# Patient Record
Sex: FEMALE | Race: WHITE | Employment: UNEMPLOYED | URBAN - METROPOLITAN AREA
[De-identification: names, ages, dates, MRNs, and addresses within clinical notes are randomized per-mention and may not be internally consistent; named-entity substitution may affect disease eponyms.]

---

## 2018-12-14 ENCOUNTER — ED HISTORICAL/CONVERTED ENCOUNTER (OUTPATIENT)
Dept: OTHER | Age: 48
End: 2018-12-14

## 2020-09-30 ENCOUNTER — TRANSCRIBE ORDER (OUTPATIENT)
Dept: SCHEDULING | Age: 50
End: 2020-09-30

## 2020-09-30 DIAGNOSIS — Z12.31 SCREENING MAMMOGRAM FOR HIGH-RISK PATIENT: Primary | ICD-10-CM

## 2021-11-05 ENCOUNTER — OFFICE VISIT (OUTPATIENT)
Dept: OBGYN CLINIC | Age: 51
End: 2021-11-05
Payer: MEDICAID

## 2021-11-05 VITALS
SYSTOLIC BLOOD PRESSURE: 112 MMHG | WEIGHT: 149.5 LBS | DIASTOLIC BLOOD PRESSURE: 68 MMHG | HEIGHT: 63 IN | BODY MASS INDEX: 26.49 KG/M2

## 2021-11-05 DIAGNOSIS — N95.0 PMB (POSTMENOPAUSAL BLEEDING): Primary | ICD-10-CM

## 2021-11-05 DIAGNOSIS — N94.6 DYSMENORRHEA: ICD-10-CM

## 2021-11-05 DIAGNOSIS — N92.1 MENORRHAGIA WITH IRREGULAR CYCLE: ICD-10-CM

## 2021-11-05 PROCEDURE — 58100 BIOPSY OF UTERUS LINING: CPT | Performed by: OBSTETRICS & GYNECOLOGY

## 2021-11-05 RX ORDER — OXYCODONE HYDROCHLORIDE 5 MG/1
5 TABLET ORAL
Qty: 10 TABLET | Refills: 0 | Status: SHIPPED | OUTPATIENT
Start: 2021-11-05 | End: 2021-11-08

## 2021-11-05 NOTE — PROGRESS NOTES
Chief Complaint   Patient presents with   [address] [facility] Patient     PMB     Visit Vitals  /68 (BP 1 Location: Right arm, BP Patient Position: Sitting, BP Cuff Size: Small adult)   Ht 5' 3\" (1.6 m)   Wt 149 lb 8 oz (67.8 kg)   BMI 26.48 kg/m²

## 2021-11-05 NOTE — PROGRESS NOTES
Procedure note: Endometrial biopsy    Kevin Love is a G4 ,  45 y.o. female WHITE/NON- No LMP recorded. (Menstrual status: Menopause). The patient has a history of The primary encounter diagnosis was PMB (postmenopausal bleeding). Diagnoses of Dysmenorrhea and Menorrhagia with irregular cycle were also pertinent to this visit. and presents for an endometrial biopsy. Indications:   After the indications, risks, benefits, and alternatives to performing an endometrial biopsy were explained to the patient, her questions were answered and informed consent was obtained. Procedure: The patient was placed on the table in the dorsal lithotomy position. A bimanual exam showed the uterus to be anterior. The uterus was enlarged. A speculum was placed in the vagina. The cervix was visualized and prepped with zephrin. A tenaculum was  placed on the anterior lip of the cervix for traction. It was necessary to dilate the cervix. A pipelle was passed through the endocervical canal without difficulty. The uterus was sounded to 8 cm's. A moderate amount of tissue was returned. This tissue was placed in formalin and sent to pathology. It was felt that an adequate sample was obtained. The patient tolerated the procedure well and she reported mild cramping. The tenaculum and speculum were removed. Post Procedural Status: The patient was observed for 10 minutes after the procedure. She had mild cramping at the time of discharge. There were no complications. The patient was discharged in stable condition. Diagnoses and all orders for this visit:    1. PMB (postmenopausal bleeding)  -     SURGICAL PATHOLOGY  -     oxyCODONE IR (ROXICODONE) 5 mg immediate release tablet; Take 1 Tablet by mouth every six (6) hours as needed for Pain for up to 3 days. Max Daily Amount: 20 mg.    2. Dysmenorrhea    3. Menorrhagia with irregular cycle    Diff.  Dx DWP    Plan: Questions addressed, await PATH  Counseled re: diet, exercise, healthy lifestyle

## 2021-11-09 ENCOUNTER — TELEPHONE (OUTPATIENT)
Dept: OBGYN CLINIC | Age: 51
End: 2021-11-09

## 2021-11-09 DIAGNOSIS — R10.2 PELVIC PAIN: Primary | ICD-10-CM

## 2021-11-09 PROBLEM — C54.9 CARCINOSARCOMA OF BODY OF UTERUS (HCC): Status: ACTIVE | Noted: 2021-11-09

## 2021-11-09 LAB
CPT CODES, 490044: ABNORMAL
CPT DISCLAIMER: ABNORMAL
CYTOLOGY SPEC DOC CYTO: ABNORMAL
DIAGNOSIS SYNOPSIS:: ABNORMAL
DX ICD CODE: ABNORMAL
DX ICD CODE: ABNORMAL
PATH REPORT.COMMENTS IMP SPEC: ABNORMAL
PATH REPORT.GROSS SPEC: ABNORMAL
PATH REPORT.RELEVANT HX SPEC: ABNORMAL
PATHOLOGIST NAME: ABNORMAL
SPECIMEN SOURCE: ABNORMAL

## 2021-11-09 RX ORDER — OXYCODONE HYDROCHLORIDE 5 MG/1
5 TABLET ORAL
Qty: 12 TABLET | Refills: 0 | Status: SHIPPED | OUTPATIENT
Start: 2021-11-09 | End: 2021-11-12

## 2021-11-09 NOTE — TELEPHONE ENCOUNTER
Patient called for the results of her biopsy. Advised her it is not back yet. She is also c/o being in pain again and requesting to speak with Dr Dipak Prasad. Message forwarded to Dr Dipak Prasad.

## 2021-11-10 NOTE — PROGRESS NOTES
PATH results DWP    Dr. Luzmaria Johnson office contacted and instructed to call pt for an appointment    Will fax last note, PATH and Demographics to Dr. Luzmaria Johnson office

## 2022-02-04 ENCOUNTER — HOSPITAL ENCOUNTER (EMERGENCY)
Age: 52
Discharge: LEFT BEFORE DISCHARGE | End: 2022-02-04
Attending: EMERGENCY MEDICINE
Payer: MEDICAID

## 2022-02-04 VITALS
HEART RATE: 89 BPM | SYSTOLIC BLOOD PRESSURE: 115 MMHG | OXYGEN SATURATION: 98 % | RESPIRATION RATE: 16 BRPM | WEIGHT: 149 LBS | HEIGHT: 63 IN | DIASTOLIC BLOOD PRESSURE: 71 MMHG | TEMPERATURE: 98.2 F | BODY MASS INDEX: 26.4 KG/M2

## 2022-02-04 DIAGNOSIS — R52 BODY ACHES: Primary | ICD-10-CM

## 2022-02-04 PROCEDURE — 99282 EMERGENCY DEPT VISIT SF MDM: CPT

## 2022-02-04 NOTE — ED TRIAGE NOTES
Presents to ed with vane says that she was called this morning from her aunt saying she was in extreme pain spoke with chemo dr and was told to come to ER to pain management. States that she is under a lot of stress.   Vane says that aunt is hallucinating and paranoia

## 2022-02-04 NOTE — ED NOTES
Pt walked out of ER with her niece. Pt states she will go to [facility] instead. Charge KEIRA Burks and Dr. Carmine Myers updated and aware.

## 2022-02-04 NOTE — BSMART NOTE
Pt arrived at ED via private vehicle (family) and assessed in ED 21    Pt presented with anxiety, Denies SI and Denies HI     Pt presented with well-groomed appearance. Pt thought process circumstantial    Pt cognition  appropriate decision making and impulsive, as she got up and walked out of the ED after [address] Intake assess. Pt reports has never been hospitalized     Most Recent Hospitalizations if any: na    Pt reports none    Pt does not have a hx of legal issues. Pt does not have hx of violence/aggression     Pt reports no substance use    Pt UDS positive for: No results    Hx. Of Substance Treatment: NO  When: Not Applicable  Where: Not Applicable    Highest Level of Education: 7th grade    Employment: NO    Source of Income: Family    Housing: [facility]    Access to Weapons: NO    If weapons, Have they been removed: N/A    Decision Making:    Does Patient have a guardian/POA: NO    If so, Name of Guardian/POA: na    Contact Information: na    Was Paperwork Provided?: NO    If not, Was it Requested:NO                                                                      Who to Follow Up With for Paperwork:na    Was Information Emailed to Director and Supervisor: NO    Trauma Hx:   Sexual: NO  When:  Not Applicable By Whom:Not Applicable    Physical: NO  When: Not Applicable By Whom:Not Applicable    Verbal: NO  When: Not Applicable By Whom:Not Applicable      Family Support: YES    Who: vane Schmitz contacted and reports pt does not meet inpatient level of care and will follow up with resources outpatient as needed. Pt is to f/u with PCP. This writer notified assigned RN [facility] and assigned physician Dr. Funmilayo Montaño. Safety Plan Completed: N/A      PATIENT NARRATIVE SUMMARY:  Pt assessed face to face in ED 21. Pt is here for pain management, sent by her Chemo Dr. Ugo cifuentes stated to triage nurse that pt was under stress, paranoid and hallucinations.   Upon assessment pt and niece state that pts live in [place] was bugging her bedroom, which was found and he is no longer living there. Pt denies paranoia, hallucinations SI HI Hallucinations. States she is here for pain management. Apparently after Intake left the room, pt got up and walked out of ED for [address] assessing her. Dr. Edgardo Ugarte notified. This writer will follow up as needed.

## 2022-02-04 NOTE — ED PROVIDER NOTES
EMERGENCY DEPARTMENT HISTORY AND PHYSICAL EXAM      Date: 2/4/2022  Patient Name: Pravin Moses      History of Presenting Illness     Chief Complaint   Patient presents with   [address] Problem    Muscle Pain       History Provided By: Patient    HPI: Pravin Moses, 45 y.o. female with a past medical history significant Metastatic cancer currently undergoing treatment presents to the ED with cc of mild diffuse body aches. She states that he may have today just has body aches all over. She denies any fever, chills, nausea, vomiting, chest pain, shortness of breath, rash, diarrhea, headache, night sweats. No exacerbating or relieving factors and she has treated with tramadol without relief. Symptoms are mild to moderate nonprogressive and constant    There are no other complaints, changes, or physical findings at this time. PCP: None        Past History     Past Medical History:  Past Medical History:   Diagnosis Date    Carcinosarcoma of body of uterus ([facility] [address] 11/09/2021    EMBX performed 11-05-21    Pelvic pain 11/05/2021    PMB (postmenopausal bleeding) 11/05/2021       Past Surgical History:  Past Surgical History:   Procedure Laterality Date    DC LAP,TUBAL CAUTERY         Family History:  Family History   Problem Relation Age of Onset    Heart Attack Sister        Social History:  Social History     Tobacco Use    Smoking status: Current Every Day Smoker     Packs/day: 0.25    Smokeless tobacco: Never Used   Vaping Use    Vaping Use: Never used   Substance Use Topics    Alcohol use: Not Currently    Drug use: Never       Allergies:  No Known Allergies      Review of Systems     Review of Systems   Constitutional: Negative. Negative for appetite change, chills, fatigue and fever. HENT: Negative. Negative for congestion and sinus pain. Eyes: Negative. Negative for pain and visual disturbance. Respiratory: Negative. Negative for chest tightness and shortness of breath. Cardiovascular: Negative. Negative for chest pain. Gastrointestinal: Negative. Negative for abdominal pain, diarrhea, nausea and vomiting. Genitourinary: Negative. Negative for difficulty urinating. No discharge   Musculoskeletal: Positive for myalgias. Negative for arthralgias. Skin: Negative. Negative for rash. Neurological: Negative. Negative for weakness and headaches. Hematological: Negative. Psychiatric/Behavioral: Negative. Negative for agitation. The patient is not nervous/anxious. All other systems reviewed and are negative. Physical Exam     Physical Exam  Vitals and nursing note reviewed. Constitutional:       General: She is not in acute distress. Appearance: She is well-developed. HENT:      Head: Normocephalic and atraumatic. Nose: Nose normal.      Mouth/Throat:      Mouth: Mucous membranes are moist.      Pharynx: Oropharynx is clear. No oropharyngeal exudate. Eyes:      General:         Right eye: No discharge. Left eye: No discharge. Conjunctiva/sclera: Conjunctivae normal.      Pupils: Pupils are equal, round, and reactive to light. Cardiovascular:      Rate and Rhythm: Normal rate and regular rhythm. Chest Wall: PMI is not displaced. No thrill. Heart sounds: Normal heart sounds. No murmur heard. No friction rub. No gallop. Comments: Port in left upper chest.... Wound from infected port in right upper chest  Pulmonary:      Effort: Pulmonary effort is normal. No respiratory distress. Breath sounds: Normal breath sounds. No wheezing or rales. Chest:      Chest wall: No tenderness. Abdominal:      General: Bowel sounds are normal. There is no distension. Palpations: Abdomen is soft. There is no mass. Tenderness: There is no abdominal tenderness. There is no guarding or rebound. Musculoskeletal:         General: Normal range of motion. Cervical back: Normal range of motion and neck supple. Lymphadenopathy:      Cervical: No cervical adenopathy. Skin:     General: Skin is warm and dry. Capillary Refill: Capillary refill takes less than 2 seconds. Findings: No erythema or rash. Neurological:      Mental Status: She is alert and oriented to person, place, and time. Cranial Nerves: No cranial nerve deficit. Coordination: Coordination normal.   Psychiatric:         Mood and Affect: Mood normal.         Behavior: Behavior normal.         Lab and Diagnostic Study Results     Labs -   No results found for this or any previous visit (from the past 12 hour(s)). Radiologic Studies -   [unfilled]  CT Results  (Last 48 hours)    None        CXR Results  (Last 48 hours)    None          Medical Decision Making and ED Course   - I am the first and primary provider for this patient AND AM THE PRIMARY PROVIDER OF RECORD. - I reviewed the vital signs, available nursing notes, past medical history, past surgical history, family history and social history. - Initial assessment performed. The patients presenting problems have been discussed, and the staff are in agreement with the care plan formulated and outlined with them. I have encouraged them to ask questions as they arise throughout their visit. Vital Signs-Reviewed the patient's vital signs. No data found. Records Reviewed: Nursing Notes    The patient presents with body aches with a differential diagnosis of posttreatment myalgias versus medication side effect, dehydration, electrolyte abnormality, rhabdomyolysis. Will assess with basic labs medical clearance and CK. Will treat with IV fluid    ED Course:              Provider Notes (Medical Decision Making):   Patient is a 62-year-old female currently undergoing radiation and chemo for metastatic cancer. Patient appeared to be dehydrated patient refused further treatment at this point time decided to leave.   He is accompanied by her daughter who is in agreement with the care plan as outlined. [facility]           Consultations:       Consultations: - NONE        Procedures and Critical Care       Performed by: Marie Howard MD  PROCEDURES:  Procedures         Disposition     Disposition: Condition stable    Discharged    Remove if not discharged  DISCHARGE PLAN:  1. There are no discharge medications for this patient. 2.   Follow-up Information     Follow up With Specialties Details Why [address] EMERGENCY DEPT Emergency Medicine Call  As needed, If symptoms worsen [address] [address]  [phone]        3. Return to ED if worse   4. There are no discharge medications for this patient. Diagnosis     Clinical Impression:   1. Body aches        Attestations:    Marie Howard MD    Please note that this dictation was completed with [employer], the computer voice recognition software. Quite often unanticipated grammatical, syntax, homophones, and other interpretive errors are inadvertently transcribed by the computer software. Please disregard these errors. Please excuse any errors that have escaped final proofreading. Thank you.

## 2022-02-10 ENCOUNTER — HOSPITAL ENCOUNTER (OUTPATIENT)
Dept: RADIATION THERAPY | Age: 52
Discharge: HOME | End: 2022-02-10

## 2022-02-15 ENCOUNTER — HOSPITAL ENCOUNTER (OUTPATIENT)
Dept: RADIATION THERAPY | Age: 52
Discharge: HOME | End: 2022-02-15
Payer: MEDICAID

## 2022-02-15 PROCEDURE — 77290 THER RAD SIMULAJ FIELD CPLX: CPT

## 2022-02-17 ENCOUNTER — HOSPITAL ENCOUNTER (OUTPATIENT)
Dept: RADIATION THERAPY | Age: 52
Discharge: HOME | End: 2022-02-17
Payer: MEDICAID

## 2022-02-17 PROCEDURE — 77290 THER RAD SIMULAJ FIELD CPLX: CPT

## 2022-02-17 PROCEDURE — 77334 RADIATION TREATMENT AID(S): CPT

## 2022-03-07 ENCOUNTER — HOSPITAL ENCOUNTER (OUTPATIENT)
Dept: RADIATION THERAPY | Age: 52
Discharge: HOME | End: 2022-03-07
Payer: MEDICAID

## 2022-03-07 PROCEDURE — 77417 THER RADIOLOGY PORT IMAGE(S): CPT

## 2022-03-07 PROCEDURE — 77412 RADIATION TX DELIVERY LVL 3: CPT

## 2022-03-08 ENCOUNTER — HOSPITAL ENCOUNTER (OUTPATIENT)
Dept: RADIATION THERAPY | Age: 52
Discharge: HOME | End: 2022-03-08
Payer: MEDICAID

## 2022-03-08 PROCEDURE — 77412 RADIATION TX DELIVERY LVL 3: CPT

## 2022-03-09 ENCOUNTER — HOSPITAL ENCOUNTER (OUTPATIENT)
Dept: RADIATION THERAPY | Age: 52
Discharge: HOME | End: 2022-03-09
Payer: MEDICAID

## 2022-03-09 PROCEDURE — 77412 RADIATION TX DELIVERY LVL 3: CPT

## 2022-03-10 ENCOUNTER — HOSPITAL ENCOUNTER (OUTPATIENT)
Dept: RADIATION THERAPY | Age: 52
Discharge: HOME | End: 2022-03-10
Payer: MEDICAID

## 2022-03-10 PROCEDURE — 77412 RADIATION TX DELIVERY LVL 3: CPT

## 2022-03-11 ENCOUNTER — HOSPITAL ENCOUNTER (OUTPATIENT)
Dept: RADIATION THERAPY | Age: 52
Discharge: HOME | End: 2022-03-11
Payer: MEDICAID

## 2022-03-14 ENCOUNTER — HOSPITAL ENCOUNTER (OUTPATIENT)
Dept: RADIATION THERAPY | Age: 52
Discharge: HOME | End: 2022-03-14
Payer: MEDICAID

## 2022-03-14 PROCEDURE — 77412 RADIATION TX DELIVERY LVL 3: CPT

## 2022-03-14 PROCEDURE — 77336 RADIATION PHYSICS CONSULT: CPT

## 2022-03-14 PROCEDURE — 77417 THER RADIOLOGY PORT IMAGE(S): CPT

## 2022-03-15 ENCOUNTER — HOSPITAL ENCOUNTER (OUTPATIENT)
Dept: RADIATION THERAPY | Age: 52
Discharge: HOME | End: 2022-03-15
Payer: MEDICAID

## 2022-03-15 PROCEDURE — 77412 RADIATION TX DELIVERY LVL 3: CPT

## 2022-03-16 ENCOUNTER — HOSPITAL ENCOUNTER (OUTPATIENT)
Dept: RADIATION THERAPY | Age: 52
Discharge: HOME | End: 2022-03-16
Payer: MEDICAID

## 2022-03-16 PROCEDURE — 77412 RADIATION TX DELIVERY LVL 3: CPT

## 2022-03-17 ENCOUNTER — HOSPITAL ENCOUNTER (OUTPATIENT)
Dept: RADIATION THERAPY | Age: 52
Discharge: HOME | End: 2022-03-17
Payer: MEDICAID

## 2022-03-17 PROCEDURE — 77412 RADIATION TX DELIVERY LVL 3: CPT

## 2022-03-18 ENCOUNTER — HOSPITAL ENCOUNTER (OUTPATIENT)
Dept: RADIATION THERAPY | Age: 52
Discharge: HOME | End: 2022-03-18
Payer: MEDICAID

## 2022-03-18 PROCEDURE — 77412 RADIATION TX DELIVERY LVL 3: CPT

## 2022-03-21 ENCOUNTER — HOSPITAL ENCOUNTER (OUTPATIENT)
Dept: RADIATION THERAPY | Age: 52
Discharge: HOME | End: 2022-03-21
Payer: MEDICAID

## 2022-03-21 PROCEDURE — 77412 RADIATION TX DELIVERY LVL 3: CPT

## 2022-03-21 PROCEDURE — 77336 RADIATION PHYSICS CONSULT: CPT

## 2022-03-21 PROCEDURE — 77417 THER RADIOLOGY PORT IMAGE(S): CPT

## 2022-03-23 ENCOUNTER — HOSPITAL ENCOUNTER (OUTPATIENT)
Dept: RADIATION THERAPY | Age: 52
Discharge: HOME | End: 2022-03-23
Payer: MEDICAID

## 2022-03-23 PROCEDURE — 77412 RADIATION TX DELIVERY LVL 3: CPT

## 2022-03-24 ENCOUNTER — HOSPITAL ENCOUNTER (OUTPATIENT)
Dept: RADIATION THERAPY | Age: 52
Discharge: HOME | End: 2022-03-24
Payer: MEDICAID

## 2022-03-24 PROCEDURE — 77412 RADIATION TX DELIVERY LVL 3: CPT

## 2022-03-25 ENCOUNTER — HOSPITAL ENCOUNTER (OUTPATIENT)
Dept: RADIATION THERAPY | Age: 52
Discharge: HOME | End: 2022-03-25
Payer: MEDICAID

## 2022-03-25 PROCEDURE — 77412 RADIATION TX DELIVERY LVL 3: CPT

## 2022-03-28 ENCOUNTER — HOSPITAL ENCOUNTER (OUTPATIENT)
Dept: RADIATION THERAPY | Age: 52
Discharge: HOME | End: 2022-03-28
Payer: MEDICAID

## 2022-03-28 PROCEDURE — 77417 THER RADIOLOGY PORT IMAGE(S): CPT

## 2022-03-28 PROCEDURE — 77412 RADIATION TX DELIVERY LVL 3: CPT

## 2022-03-29 ENCOUNTER — HOSPITAL ENCOUNTER (OUTPATIENT)
Dept: RADIATION THERAPY | Age: 52
Discharge: HOME | End: 2022-03-29
Payer: MEDICAID

## 2022-03-29 PROCEDURE — 77412 RADIATION TX DELIVERY LVL 3: CPT

## 2022-03-29 PROCEDURE — 77336 RADIATION PHYSICS CONSULT: CPT

## 2022-03-30 ENCOUNTER — HOSPITAL ENCOUNTER (OUTPATIENT)
Dept: RADIATION THERAPY | Age: 52
Discharge: HOME | End: 2022-03-30
Payer: MEDICAID

## 2022-03-30 PROCEDURE — 77412 RADIATION TX DELIVERY LVL 3: CPT

## 2022-03-31 ENCOUNTER — HOSPITAL ENCOUNTER (OUTPATIENT)
Dept: RADIATION THERAPY | Age: 52
Discharge: HOME | End: 2022-03-31
Payer: MEDICAID

## 2022-03-31 PROCEDURE — 77412 RADIATION TX DELIVERY LVL 3: CPT

## 2022-04-01 ENCOUNTER — HOSPITAL ENCOUNTER (OUTPATIENT)
Dept: RADIATION THERAPY | Age: 52
Discharge: HOME | End: 2022-04-01
Payer: MEDICAID

## 2022-04-01 PROCEDURE — 77412 RADIATION TX DELIVERY LVL 3: CPT

## 2022-04-11 ENCOUNTER — HOSPITAL ENCOUNTER (OUTPATIENT)
Dept: RADIATION THERAPY | Age: 52
Discharge: HOME | End: 2022-04-11
Payer: MEDICAID

## 2022-04-11 PROCEDURE — 77417 THER RADIOLOGY PORT IMAGE(S): CPT

## 2022-04-11 PROCEDURE — 77412 RADIATION TX DELIVERY LVL 3: CPT

## 2022-04-13 ENCOUNTER — HOSPITAL ENCOUNTER (OUTPATIENT)
Dept: RADIATION THERAPY | Age: 52
Discharge: HOME | End: 2022-04-13
Payer: MEDICAID

## 2022-04-13 PROCEDURE — 77412 RADIATION TX DELIVERY LVL 3: CPT

## 2022-04-13 PROCEDURE — 77336 RADIATION PHYSICS CONSULT: CPT

## 2022-04-14 ENCOUNTER — HOSPITAL ENCOUNTER (OUTPATIENT)
Dept: RADIATION THERAPY | Age: 52
Discharge: HOME | End: 2022-04-14
Payer: MEDICAID

## 2022-04-14 PROCEDURE — 77412 RADIATION TX DELIVERY LVL 3: CPT

## 2022-04-15 ENCOUNTER — APPOINTMENT (OUTPATIENT)
Dept: RADIATION THERAPY | Age: 52
End: 2022-04-15
Payer: MEDICAID

## 2022-04-20 ENCOUNTER — HOSPITAL ENCOUNTER (OUTPATIENT)
Dept: RADIATION THERAPY | Age: 52
Discharge: HOME | End: 2022-04-20
Payer: MEDICAID

## 2022-04-20 PROCEDURE — 77417 THER RADIOLOGY PORT IMAGE(S): CPT

## 2022-04-20 PROCEDURE — 77412 RADIATION TX DELIVERY LVL 3: CPT

## 2022-04-21 ENCOUNTER — HOSPITAL ENCOUNTER (OUTPATIENT)
Dept: RADIATION THERAPY | Age: 52
Discharge: HOME | End: 2022-04-21
Payer: MEDICAID

## 2022-04-21 PROCEDURE — 77412 RADIATION TX DELIVERY LVL 3: CPT

## 2022-04-22 ENCOUNTER — HOSPITAL ENCOUNTER (OUTPATIENT)
Dept: RADIATION THERAPY | Age: 52
Discharge: HOME | End: 2022-04-22
Payer: MEDICAID

## 2022-04-22 PROCEDURE — 77412 RADIATION TX DELIVERY LVL 3: CPT

## 2022-04-25 ENCOUNTER — HOSPITAL ENCOUNTER (OUTPATIENT)
Dept: RADIATION THERAPY | Age: 52
Discharge: HOME | End: 2022-04-25
Payer: MEDICAID

## 2022-04-25 PROCEDURE — 77412 RADIATION TX DELIVERY LVL 3: CPT

## 2022-04-25 PROCEDURE — 77336 RADIATION PHYSICS CONSULT: CPT

## 2022-04-26 ENCOUNTER — HOSPITAL ENCOUNTER (OUTPATIENT)
Dept: RADIATION THERAPY | Age: 52
Discharge: HOME | End: 2022-04-26
Payer: MEDICAID

## 2022-04-26 PROCEDURE — 77770 HDR RDNCL NTRSTL/ICAV BRCHTX: CPT

## 2022-04-26 PROCEDURE — 77316 BRACHYTX ISODOSE PLAN SIMPLE: CPT

## 2022-04-26 PROCEDURE — 77370 RADIATION PHYSICS CONSULT: CPT

## 2022-04-26 PROCEDURE — 77290 THER RAD SIMULAJ FIELD CPLX: CPT

## 2022-04-26 PROCEDURE — 77334 RADIATION TREATMENT AID(S): CPT

## 2022-04-27 ENCOUNTER — APPOINTMENT (OUTPATIENT)
Dept: RADIATION THERAPY | Age: 52
End: 2022-04-27
Payer: MEDICAID

## 2022-04-28 ENCOUNTER — HOSPITAL ENCOUNTER (OUTPATIENT)
Dept: RADIATION THERAPY | Age: 52
Discharge: HOME | End: 2022-04-28
Payer: MEDICAID

## 2022-04-28 PROCEDURE — 77770 HDR RDNCL NTRSTL/ICAV BRCHTX: CPT

## 2022-04-28 PROCEDURE — 77290 THER RAD SIMULAJ FIELD CPLX: CPT

## 2022-04-28 PROCEDURE — 77370 RADIATION PHYSICS CONSULT: CPT

## 2022-04-29 ENCOUNTER — APPOINTMENT (OUTPATIENT)
Dept: RADIATION THERAPY | Age: 52
End: 2022-04-29
Payer: MEDICAID

## 2022-05-02 ENCOUNTER — APPOINTMENT (OUTPATIENT)
Dept: RADIATION THERAPY | Age: 52
End: 2022-05-02
Payer: MEDICAID

## 2022-05-03 ENCOUNTER — HOSPITAL ENCOUNTER (OUTPATIENT)
Dept: RADIATION THERAPY | Age: 52
Discharge: HOME | End: 2022-05-03
Payer: MEDICAID

## 2022-05-03 PROCEDURE — 77770 HDR RDNCL NTRSTL/ICAV BRCHTX: CPT

## 2022-05-03 PROCEDURE — 77290 THER RAD SIMULAJ FIELD CPLX: CPT

## 2022-05-03 PROCEDURE — 77370 RADIATION PHYSICS CONSULT: CPT

## 2022-05-16 ENCOUNTER — HOSPITAL ENCOUNTER (OUTPATIENT)
Dept: WOUND CARE | Age: 52
Discharge: HOME | End: 2022-05-16
Payer: MEDICAID

## 2022-05-16 VITALS
WEIGHT: 145 LBS | RESPIRATION RATE: 18 BRPM | HEART RATE: 90 BPM | TEMPERATURE: 97.3 F | BODY MASS INDEX: 25.69 KG/M2 | HEIGHT: 63 IN | SYSTOLIC BLOOD PRESSURE: 120 MMHG | DIASTOLIC BLOOD PRESSURE: 70 MMHG

## 2022-05-16 PROBLEM — S21.101A: Status: ACTIVE | Noted: 2022-05-16

## 2022-05-16 PROBLEM — T81.31XA SURGICAL WOUND BREAKDOWN: Status: ACTIVE | Noted: 2022-05-16

## 2022-05-16 PROCEDURE — 74011000250 HC RX REV CODE- 250: Performed by: SPECIALIST

## 2022-05-16 PROCEDURE — 99213 OFFICE O/P EST LOW 20 MIN: CPT

## 2022-05-16 RX ORDER — SULFAMETHOXAZOLE AND TRIMETHOPRIM 800; 160 MG/1; MG/1
1 TABLET ORAL 2 TIMES DAILY
COMMUNITY

## 2022-05-16 RX ORDER — LIDOCAINE HYDROCHLORIDE 20 MG/ML
15 SOLUTION OROPHARYNGEAL AS NEEDED
Status: DISCONTINUED | OUTPATIENT
Start: 2022-05-16 | End: 2022-05-18 | Stop reason: HOSPADM

## 2022-05-16 NOTE — WOUND CARE
Discharge Instructions for  [address] [place] [address], [address] [place] [address]  Telephone: [phone]    NAME:  Ashok Zambrano OF BIRTH:  1970  MEDICAL RECORD NUMBER:  [id number]  DATE:  5/16/2022      Return Appointment:  [] Dressing supply provider:   [] Home Healthcare:  [x] Return Appointment: 1 Week(s)  [] Nurse Visit:  Week(s)    [] Discharge from [facility]  [] Ordered tests:   [] Referrals:   [] Rx:   [] Other:      Wound Cleansing:   Do not scrub or use excessive force. Cleanse wound prior to applying a clean dressing with:  [] Normal Saline   [x] Mild Soap & Water/Baby Shampoo   [] Keep Wound Dry in Shower  [] Other:      Topical Treatments:  Do not apply lotions, creams, or ointments to wound bed unless directed. [] Apply moisturizing lotion to skin surrounding the wound prior to dressing change.  [] Apply antifungal ointment to skin surrounding the wound prior to dressing change.  [] Apply thin film of moisture barrier/zinc ointment to skin immediately around wound. [] Other:       Dressings:           Wound Location R chest   [x] Apply Primary Dressing:       [x] MediHoney Gel    [] MediHoney Alginate               [] Calcium Alginate      [] Calcium Alginate with Silver   [] Collagen                   [] Collagen with Silver                [] Santyl with Moistened saline gauze              [] Hydrofera Blue (cut to size and moistened)  [] Hydrofera Blue Ready (Cut to size)   [] NS wet to dry    [] Betadine wet to dry              [] Hydrogel                [] Mepitel     [] Bactroban/Mupirocin               [] Other:     [x] Cover and Secure with:     [x] Gauze [] Christopher Mjs [] Kerlix   [] Foam [] Super Absorbant [] ABD     [] ACE Wrap            [x] Other: Border   Limit contact of tape with skin.     [x] Change dressing: [x] Daily    [] Every Other Day [] Once per week   [] Twice per week   [] Three times per week [] Do Not Change Dressing   [] Other:    Dietary:  [] Diet as tolerated: [] Calorie Diabetic Diet: [] No Added Salt:  [x] Increase Protein: [] Other:     Activity:  [x] Activity as tolerated:    [] Patient has no activity restrictions       [] Strict Bedrest:   [] Remain off Work:       [] May return to full duty work:                                     [] Return to work with restrictions:               [address] Information: Should you experience any significant changes in your wound(s) or have questions about your wound care, please contact Jordana Gibbs [address] at [place] [address] 8:00 am - 4:30. If you need help with your wound outside of these hours and cannot wait until we are again available, contact your PCP or go to the hospital emergency room. PLEASE NOTE: IF YOU ARE UNABLE TO OBTAIN WOUND SUPPLIES, CONTINUE TO USE THE SUPPLIES YOU HAVE AVAILABLE UNTIL YOU ARE ABLE TO REACH US. IT IS MOST IMPORTANT TO KEEP THE WOUND COVERED AT ALL TIMES.     [x] Dr. Jesus Mckeon   [] Dr. Jesus Diaz  [] Dr. Jemma Smyth

## 2022-05-16 NOTE — WOUND CARE
[address]   Progress Note and Procedure Note   NO Procedure      [address] RECORD NUMBER:  [id number]  AGE: 45 y.o. RACE WHITE/NON-  GENDER: female  : 1970  EPISODE DATE:  2022    Subjective:   58-year-old female had port inserted for chemotherapy administration. She has had complications with wound infection involving a left subclavian port site, and Mediport was removed. That site has healed. However, she had a port on the right side which also became infected apparently and that was also removed. Is been some delayed wound healing on the right anterior chest wall, which has been treated with Medihoney gel, the patient came to the wound healing center for second opinion. Chief Complaint   Patient presents with    Wound Check     R chest         HISTORY of PRESENT ILLNESS HPI    Ronaldo Richardson is a 45 y.o. female who presents today for wound/ulcer evaluation.    History of Wound Context: R chest  Wound/Ulcer Pain Timing/Severity: none  Quality of pain: dull  Severity:  0 / 10   Modifying Factors: None  Associated Signs/Symptoms: none    Ulcer Identification:  Ulcer Type: non-healing surgical    Contributing Factors: none    Wound: R chest         PAST MEDICAL HISTORY    Past Medical History:   Diagnosis Date    Carcinosarcoma of body of uterus ([address] 2021    EMBX performed 21    Pelvic pain 2021    PMB (postmenopausal bleeding) 2021        PAST SURGICAL HISTORY    Past Surgical History:   Procedure Laterality Date    HX GYN      HX HYSTERECTOMY      MI LAP,TUBAL CAUTERY         FAMILY HISTORY    Family History   Problem Relation Age of Onset    Heart Attack Sister        SOCIAL HISTORY    Social History     Tobacco Use    Smoking status: Current Every Day Smoker     Packs/day: 0.25    Smokeless tobacco: Never Used   Vaping Use    Vaping Use: Never used   Substance Use Topics    Alcohol use: Not Currently    Drug use: Never       ALLERGIES    No Known Allergies    MEDICATIONS    Current Outpatient Medications on File Prior to Encounter   Medication Sig Dispense Refill    trimethoprim-sulfamethoxazole (Bactrim DS) 160-800 mg per tablet Take 1 Tablet by mouth two (2) times a day. No current facility-administered medications on file prior to encounter. REVIEW OF SYSTEMS    Pertinent items are noted in HPI. Objective:     Visit Vitals  /70 (BP 1 Location: Right upper arm, BP Patient Position: At rest;Sitting)   Pulse 90   Temp 97.3 °F (36.3 °C)   Resp 18   Ht 5' 3\" (1.6 m)   Wt 65.8 kg (145 lb)   BMI 25.69 kg/m²       Wt Readings from Last 3 Encounters:   05/16/22 65.8 kg (145 lb)   02/04/22 67.6 kg (149 lb)   11/05/21 67.8 kg (149 lb 8 oz)       PHYSICAL EXAM  Patient has bilateral port, the left port site is healed. The right subclavian port site has small areas of open skin breakdown, no evidence of active infection    Pertinent items are noted in HPI. Assessment:   Right anterior chest wall surgical site wound almost healed    Problem List Items Addressed This Visit     None          Procedure Note  Indications:  Based on my examination of this patient's wound(s)/ulcer(s) today, debridement is not required to promote healing and evaluate the wound base. Wound Chest Right #1 05/16/22 (Active)   Wound Image   05/16/22 1030   Wound Etiology Other (Comment) 05/16/22 1030   Dressing Status Clean;Dry; Intact 05/16/22 1030   Cleansed Cleansed with saline 05/16/22 1030   Wound Length (cm) 1.5 cm 05/16/22 1030   Wound Width (cm) 1.4 cm 05/16/22 1030   Wound Depth (cm) 0.1 cm 05/16/22 1030   Wound Surface Area (cm^2) 2.1 cm^2 05/16/22 1030   Wound Volume (cm^3) 0.21 cm^3 05/16/22 1030   Wound Assessment Granulation tissue 05/16/22 1030   Drainage Amount Moderate 05/16/22 1030   Drainage Description Sanguineous 05/16/22 1030   Wound Odor None 05/16/22 1030   Barbie-Wound/Incision Assessment Intact 05/16/22 1030 Edges Attached edges 05/16/22 1030   Wound Thickness Description Partial thickness 05/16/22 1030   Number of days: 0        Plan:   Continue Medihoney gel, dry sterile gauze  May shower daily and wash area with gentle soap    Treatment Note please see attached Discharge Instructions    Written patient dismissal instructions given to patient or POA.          Electronically signed by Mary Bourgeois MD on 5/16/2022 at 10:46 AM

## 2022-05-16 NOTE — DISCHARGE INSTRUCTIONS
Discharge Instructions for  [address] [place] [address], [address] [place] [address]  Telephone: [phone]    NAME:  Onofre Cadet OF BIRTH:  1970  MEDICAL RECORD NUMBER:  [id number]  DATE:  5/16/2022      Return Appointment:  [] Dressing supply provider:   [] Home Healthcare:  [x] Return Appointment: 1 Week(s)  [] Nurse Visit:  Week(s)    [] Discharge from [facility]  [] Ordered tests:   [] Referrals:   [] Rx:   [] Other:      Wound Cleansing:   Do not scrub or use excessive force. Cleanse wound prior to applying a clean dressing with:  [] Normal Saline   [x] Mild Soap & Water/Baby Shampoo   [] Keep Wound Dry in Shower  [] Other:      Topical Treatments:  Do not apply lotions, creams, or ointments to wound bed unless directed. [] Apply moisturizing lotion to skin surrounding the wound prior to dressing change.  [] Apply antifungal ointment to skin surrounding the wound prior to dressing change.  [] Apply thin film of moisture barrier/zinc ointment to skin immediately around wound. [] Other:       Dressings:           Wound Location R chest   [x] Apply Primary Dressing:       [x] MediHoney Gel    [] MediHoney Alginate               [] Calcium Alginate      [] Calcium Alginate with Silver   [] Collagen                   [] Collagen with Silver                [] Santyl with Moistened saline gauze              [] Hydrofera Blue (cut to size and moistened)  [] Hydrofera Blue Ready (Cut to size)   [] NS wet to dry    [] Betadine wet to dry              [] Hydrogel                [] Mepitel     [] Bactroban/Mupirocin               [] Other:     [x] Cover and Secure with:     [x] Gauze [] Espinoza Estimable [] Kerlix   [] Foam [] Super Absorbant [] ABD     [] ACE Wrap            [x] Other: Border   Limit contact of tape with skin.     [x] Change dressing: [x] Daily    [] Every Other Day [] Once per week   [] Twice per week   [] Three times per week [] Do Not Change Dressing   [] Other:    Dietary:  [] Diet as tolerated: [] Calorie Diabetic Diet: [] No Added Salt:  [x] Increase Protein: [] Other:     Activity:  [x] Activity as tolerated:    [] Patient has no activity restrictions       [] Strict Bedrest:   [] Remain off Work:       [] May return to full duty work:                                     [] Return to work with restrictions:               [address] Information: Should you experience any significant changes in your wound(s) or have questions about your wound care, please contact Jordana Gibbs [address] [place] [address] 8:00 am - 4:30. If you need help with your wound outside of these hours and cannot wait until we are again available, contact your PCP or go to the hospital emergency room. PLEASE NOTE: IF YOU ARE UNABLE TO OBTAIN WOUND SUPPLIES, CONTINUE TO USE THE SUPPLIES YOU HAVE AVAILABLE UNTIL YOU ARE ABLE TO REACH US. IT IS MOST IMPORTANT TO KEEP THE WOUND COVERED AT ALL TIMES.     [x] Dr. Gail Fontana   [] Dr. Keke Guadalupe  [] Dr. Desi Gonzalez

## 2022-06-03 ENCOUNTER — TRANSCRIBE ORDER (OUTPATIENT)
Dept: SCHEDULING | Age: 52
End: 2022-06-03

## 2022-06-03 DIAGNOSIS — C54.2 CANCER OF MYOMETRIUM (HCC): Primary | ICD-10-CM

## 2022-10-21 ENCOUNTER — HOSPITAL ENCOUNTER (EMERGENCY)
Age: 52
Discharge: ANOTHER HOSPITAL | End: 2022-10-22
Payer: MEDICAID

## 2022-10-21 ENCOUNTER — APPOINTMENT (OUTPATIENT)
Dept: CT IMAGING | Age: 52
End: 2022-10-21
Attending: NURSE PRACTITIONER
Payer: MEDICAID

## 2022-10-21 DIAGNOSIS — K65.1 INTRAPERITONEAL ABSCESS (HCC): ICD-10-CM

## 2022-10-21 DIAGNOSIS — N99.89 POSTOPERATIVE SURGICAL COMPLICATION INVOLVING GENITOURINARY SYSTEM ASSOCIATED WITH GENITOURINARY PROCEDURE, UNSPECIFIED COMPLICATION: Primary | ICD-10-CM

## 2022-10-21 DIAGNOSIS — D64.9 ANEMIA, UNSPECIFIED TYPE: ICD-10-CM

## 2022-10-21 LAB
ALBUMIN SERPL-MCNC: 2.1 G/DL (ref 3.5–5)
ALBUMIN/GLOB SERPL: 0.4 {RATIO} (ref 1.1–2.2)
ALP SERPL-CCNC: 83 U/L (ref 45–117)
ALT SERPL-CCNC: 11 U/L (ref 12–78)
ANION GAP SERPL CALC-SCNC: 7 MMOL/L (ref 5–15)
AST SERPL W P-5'-P-CCNC: 20 U/L (ref 15–37)
BASOPHILS # BLD: 0 K/UL (ref 0–0.1)
BASOPHILS NFR BLD: 0 % (ref 0–1)
BILIRUB SERPL-MCNC: 0.7 MG/DL (ref 0.2–1)
BLASTS NFR BLD MANUAL: 2 %
BUN SERPL-MCNC: 32 MG/DL (ref 6–20)
BUN/CREAT SERPL: 30 (ref 12–20)
CA-I BLD-MCNC: 8.5 MG/DL (ref 8.5–10.1)
CHLORIDE SERPL-SCNC: 108 MMOL/L (ref 97–108)
CO2 SERPL-SCNC: 19 MMOL/L (ref 21–32)
CREAT SERPL-MCNC: 1.06 MG/DL (ref 0.55–1.02)
DIFFERENTIAL METHOD BLD: ABNORMAL
EOSINOPHIL # BLD: 0 K/UL (ref 0–0.4)
EOSINOPHIL NFR BLD: 0 % (ref 0–7)
ERYTHROCYTE [DISTWIDTH] IN BLOOD BY AUTOMATED COUNT: 17.1 % (ref 11.5–14.5)
GLOBULIN SER CALC-MCNC: 4.8 G/DL (ref 2–4)
GLUCOSE SERPL-MCNC: 116 MG/DL (ref 65–100)
HCT VFR BLD AUTO: 17.7 % (ref 35–47)
HGB BLD-MCNC: 5.8 G/DL (ref 11.5–16)
IMM GRANULOCYTES # BLD AUTO: 0 K/UL
IMM GRANULOCYTES NFR BLD AUTO: 0 %
LACTATE SERPL-SCNC: 1 MMOL/L (ref 0.4–2)
LIPASE SERPL-CCNC: 48 U/L (ref 73–393)
LYMPHOCYTES # BLD: 0.8 K/UL (ref 0.8–3.5)
LYMPHOCYTES NFR BLD: 6 % (ref 12–49)
MCH RBC QN AUTO: 30.5 PG (ref 26–34)
MCHC RBC AUTO-ENTMCNC: 32.8 G/DL (ref 30–36.5)
MCV RBC AUTO: 93.2 FL (ref 80–99)
MONOCYTES # BLD: 0.8 K/UL (ref 0–1)
MONOCYTES NFR BLD: 6 % (ref 5–13)
MYELOCYTES NFR BLD MANUAL: 3 %
NEUTS BAND NFR BLD MANUAL: 1 % (ref 0–6)
NEUTS SEG # BLD: 11 K/UL (ref 1.8–8)
NEUTS SEG NFR BLD: 82 % (ref 32–75)
NRBC # BLD: 0.05 K/UL (ref 0–0.01)
NRBC BLD-RTO: 0.4 PER 100 WBC
PLATELET # BLD AUTO: 190 K/UL (ref 150–400)
PMV BLD AUTO: 11.6 FL (ref 8.9–12.9)
POTASSIUM SERPL-SCNC: 4.5 MMOL/L (ref 3.5–5.1)
PROT SERPL-MCNC: 6.9 G/DL (ref 6.4–8.2)
RBC # BLD AUTO: 1.9 M/UL (ref 3.8–5.2)
RBC MORPH BLD: ABNORMAL
SODIUM SERPL-SCNC: 134 MMOL/L (ref 136–145)
WBC # BLD AUTO: 13.2 K/UL (ref 3.6–11)

## 2022-10-21 PROCEDURE — 96374 THER/PROPH/DIAG INJ IV PUSH: CPT

## 2022-10-21 PROCEDURE — 86900 BLOOD TYPING SEROLOGIC ABO: CPT

## 2022-10-21 PROCEDURE — 96375 TX/PRO/DX INJ NEW DRUG ADDON: CPT

## 2022-10-21 PROCEDURE — P9016 RBC LEUKOCYTES REDUCED: HCPCS

## 2022-10-21 PROCEDURE — 85025 COMPLETE CBC W/AUTO DIFF WBC: CPT

## 2022-10-21 PROCEDURE — 80053 COMPREHEN METABOLIC PANEL: CPT

## 2022-10-21 PROCEDURE — 36430 TRANSFUSION BLD/BLD COMPNT: CPT

## 2022-10-21 PROCEDURE — 36415 COLL VENOUS BLD VENIPUNCTURE: CPT

## 2022-10-21 PROCEDURE — 83605 ASSAY OF LACTIC ACID: CPT

## 2022-10-21 PROCEDURE — 74011250636 HC RX REV CODE- 250/636: Performed by: NURSE PRACTITIONER

## 2022-10-21 PROCEDURE — 87040 BLOOD CULTURE FOR BACTERIA: CPT

## 2022-10-21 PROCEDURE — 83690 ASSAY OF LIPASE: CPT

## 2022-10-21 PROCEDURE — 86923 COMPATIBILITY TEST ELECTRIC: CPT

## 2022-10-21 PROCEDURE — 74177 CT ABD & PELVIS W/CONTRAST: CPT

## 2022-10-21 PROCEDURE — 74011000636 HC RX REV CODE- 636: Performed by: NURSE PRACTITIONER

## 2022-10-21 PROCEDURE — 99285 EMERGENCY DEPT VISIT HI MDM: CPT

## 2022-10-21 RX ORDER — SODIUM CHLORIDE 9 MG/ML
250 INJECTION, SOLUTION INTRAVENOUS AS NEEDED
Status: DISCONTINUED | OUTPATIENT
Start: 2022-10-21 | End: 2022-10-22 | Stop reason: HOSPADM

## 2022-10-21 RX ORDER — LEVOFLOXACIN 5 MG/ML
750 INJECTION, SOLUTION INTRAVENOUS ONCE
Status: COMPLETED | OUTPATIENT
Start: 2022-10-21 | End: 2022-10-22

## 2022-10-21 RX ORDER — ONDANSETRON 2 MG/ML
4 INJECTION INTRAMUSCULAR; INTRAVENOUS
Status: COMPLETED | OUTPATIENT
Start: 2022-10-21 | End: 2022-10-21

## 2022-10-21 RX ORDER — METRONIDAZOLE 500 MG/100ML
500 INJECTION, SOLUTION INTRAVENOUS
Status: COMPLETED | OUTPATIENT
Start: 2022-10-21 | End: 2022-10-22

## 2022-10-21 RX ORDER — MORPHINE SULFATE 4 MG/ML
4 INJECTION INTRAVENOUS ONCE
Status: COMPLETED | OUTPATIENT
Start: 2022-10-21 | End: 2022-10-21

## 2022-10-21 RX ADMIN — METRONIDAZOLE 500 MG: 500 INJECTION, SOLUTION INTRAVENOUS at 23:05

## 2022-10-21 RX ADMIN — IOPAMIDOL 100 ML: 755 INJECTION, SOLUTION INTRAVENOUS at 21:48

## 2022-10-21 RX ADMIN — MORPHINE SULFATE 4 MG: 4 INJECTION, SOLUTION INTRAMUSCULAR; INTRAVENOUS at 21:30

## 2022-10-21 RX ADMIN — SODIUM CHLORIDE 1000 ML: 9 INJECTION, SOLUTION INTRAVENOUS at 21:29

## 2022-10-21 RX ADMIN — ONDANSETRON 4 MG: 2 INJECTION INTRAMUSCULAR; INTRAVENOUS at 21:30

## 2022-10-21 NOTE — ED TRIAGE NOTES
Had hyst and colon removal due to cancer. Tonight developed abd pain while playing with grandchildren.   No n/v/d

## 2022-10-22 VITALS
WEIGHT: 121 LBS | BODY MASS INDEX: 22.26 KG/M2 | HEIGHT: 62 IN | OXYGEN SATURATION: 99 % | DIASTOLIC BLOOD PRESSURE: 92 MMHG | RESPIRATION RATE: 20 BRPM | SYSTOLIC BLOOD PRESSURE: 124 MMHG | HEART RATE: 104 BPM | TEMPERATURE: 97.9 F

## 2022-10-22 LAB
ABO + RH BLD: NORMAL
BLD PROD TYP BPU: NORMAL
BLOOD GROUP ANTIBODIES SERPL: NEGATIVE
BPU ID: NORMAL
CROSSMATCH RESULT,%XM: NORMAL
SPECIMEN EXP DATE BLD: NORMAL
STATUS OF UNIT,%ST: NORMAL
TRANSFUSION STATUS PATIENT QL: NORMAL
UNIT DIVISION, %UDIV: 0

## 2022-10-22 PROCEDURE — 96375 TX/PRO/DX INJ NEW DRUG ADDON: CPT

## 2022-10-22 PROCEDURE — 74011250636 HC RX REV CODE- 250/636: Performed by: NURSE PRACTITIONER

## 2022-10-22 RX ADMIN — LEVOFLOXACIN 750 MG: 5 INJECTION, SOLUTION INTRAVENOUS at 01:35

## 2022-10-22 NOTE — ED PROVIDER NOTES
EMERGENCY DEPARTMENT HISTORY AND PHYSICAL EXAM      Date: 10/21/2022  Patient Name: Jose Raul Mcarthur    History of Presenting Illness     Chief Complaint   Patient presents with    Abdominal Pain       History Provided By: Patient and family members    HPI: Jose Raul Mcarthur, 47 y.o. female with a past medical history of anemia, COPD and uterine cancer presents to the emergency department with cc of abdominal pain. Patient is weak and ill-appearing, unable to provide much detail. Family reports patient has had ongoing abdominal pain x1 year and reportedly underwent a complete hysterectomy followed by chemo and radiation within the last year. She had a partial colectomy with colostomy by Dr. Chi Sidhu at [place] approximately 3 weeks ago. There is unsure why she required this surgery. Patient states her abdominal pain has been ongoing since surgery but much worse over the last few days. She reports foul-smelling vaginal odor and discharge; advised that she was seen 2 days ago by her surgeon for the same but was not prescribed any medications. Patient states she has never been prescribed any medication for her pain. Family states patient has become much weaker over the last 2 days and is now unable to walk. She reports poor appetite, denies any nausea or vomiting. Unsure about fevers. Complaining of generalized abdominal pain and perineal discomfort, severe in nature. There are no other complaints, changes, or physical findings at this time. PCP: Vivienne Urbina MD    No current facility-administered medications on file prior to encounter. Current Outpatient Medications on File Prior to Encounter   Medication Sig Dispense Refill    trimethoprim-sulfamethoxazole (Bactrim DS) 160-800 mg per tablet Take 1 Tablet by mouth two (2) times a day.          Past History     Past Medical History:  Past Medical History:   Diagnosis Date    Carcinosarcoma of body of uterus ([facility] [address] 11/09/2021    EMBX performed 11-05-21 Pelvic pain 11/05/2021    PMB (postmenopausal bleeding) 11/05/2021       Past Surgical History:  Past Surgical History:   Procedure Laterality Date    HX GYN      HX HYSTERECTOMY      ME LAP,TUBAL CAUTERY         Family History:  Family History   Problem Relation Age of Onset    Heart Attack Sister        Social History:  Social History     Tobacco Use    Smoking status: Every Day     Packs/day: 0.25     Types: Cigarettes    Smokeless tobacco: Never   Vaping Use    Vaping Use: Never used   Substance Use Topics    Alcohol use: Not Currently    Drug use: Never       Allergies:  No Known Allergies    Review of Systems   Review of Systems   Unable to perform ROS: Acuity of condition     Physical Exam   Physical Exam  Vitals and nursing note reviewed. Constitutional:       Appearance: She is underweight. She is ill-appearing. HENT:      Head: Normocephalic and atraumatic. Eyes:      Extraocular Movements: Extraocular movements intact. Conjunctiva/sclera: Conjunctivae normal.   Cardiovascular:      Rate and Rhythm: Regular rhythm. Tachycardia present. Heart sounds: Normal heart sounds. Pulmonary:      Effort: Pulmonary effort is normal. No tachypnea or accessory muscle usage. Breath sounds: Decreased breath sounds present. No wheezing or rales. Abdominal:      General: Abdomen is flat. Bowel sounds are decreased. Palpations: Abdomen is soft. Tenderness: There is generalized abdominal tenderness. There is guarding. There is no rebound. Comments: Colostomy with brown soft stool   Musculoskeletal:         General: Normal range of motion. Cervical back: Normal range of motion and neck supple. Right lower leg: No edema. Left lower leg: No edema. Skin:     General: Skin is warm and dry. Neurological:      General: No focal deficit present. Mental Status: She is alert.        Lab and Diagnostic Study Results   Labs -     Recent Results (from the past 12 hour(s)) CBC WITH AUTOMATED DIFF    Collection Time: 10/21/22  8:18 PM   Result Value Ref Range    WBC 13.2 (H) 3.6 - 11.0 K/uL    RBC 1.90 (L) 3.80 - 5.20 M/uL    HGB 5.8 (LL) 11.5 - 16.0 g/dL    HCT 17.7 (LL) 35.0 - 47.0 %    MCV 93.2 80.0 - 99.0 FL    MCH 30.5 26.0 - 34.0 PG    MCHC 32.8 30.0 - 36.5 g/dL    RDW 17.1 (H) 11.5 - 14.5 %    PLATELET 189 873 - 959 K/uL    MPV 11.6 8.9 - 12.9 FL    NRBC 0.4 (H) 0.0  WBC    ABSOLUTE NRBC 0.05 (H) 0.00 - 0.01 K/uL    NEUTROPHILS 82 (H) 32 - 75 %    BAND NEUTROPHILS 1 0 - 6 %    LYMPHOCYTES 6 (L) 12 - 49 %    MONOCYTES 6 5 - 13 %    EOSINOPHILS 0 0 - 7 %    BASOPHILS 0 0 - 1 %    MYELOCYTES 3 (H) 0 %    BLASTS 2 (H) 0 %    IMMATURE GRANULOCYTES 0 %    ABS. NEUTROPHILS 11.0 (H) 1.8 - 8.0 K/UL    ABS. LYMPHOCYTES 0.8 0.8 - 3.5 K/UL    ABS. MONOCYTES 0.8 0.0 - 1.0 K/UL    ABS. EOSINOPHILS 0.0 0.0 - 0.4 K/UL    ABS. BASOPHILS 0.0 0.0 - 0.1 K/UL    ABS. IMM. GRANS. 0.0 K/UL    DF Manual      RBC COMMENTS Microcytosis  3+        RBC COMMENTS Hypochromia  1+        RBC COMMENTS Polychromasia  1+        RBC COMMENTS Spherocytes  1+        RBC COMMENTS Anisocytosis  1+       METABOLIC PANEL, COMPREHENSIVE    Collection Time: 10/21/22  8:18 PM   Result Value Ref Range    Sodium 134 (L) 136 - 145 mmol/L    Potassium 4.5 3.5 - 5.1 mmol/L    Chloride 108 97 - 108 mmol/L    CO2 19 (L) 21 - 32 mmol/L    Anion gap 7 5 - 15 mmol/L    Glucose 116 (H) 65 - 100 mg/dL    BUN 32 (H) 6 - 20 mg/dL    Creatinine 1.06 (H) 0.55 - 1.02 mg/dL    BUN/Creatinine ratio 30 (H) 12 - 20      eGFR >60 >60 ml/min/1.73m2    Calcium 8.5 8.5 - 10.1 mg/dL    Bilirubin, total 0.7 0.2 - 1.0 mg/dL    AST (SGOT) 20 15 - 37 U/L    ALT (SGPT) 11 (L) 12 - 78 U/L    Alk.  phosphatase 83 45 - 117 U/L    Protein, total 6.9 6.4 - 8.2 g/dL    Albumin 2.1 (L) 3.5 - 5.0 g/dL    Globulin 4.8 (H) 2.0 - 4.0 g/dL    A-G Ratio 0.4 (L) 1.1 - 2.2     LIPASE    Collection Time: 10/21/22  8:18 PM   Result Value Ref Range    Lipase 48 (L) 73 - 393 U/L   TYPE & SCREEN    Collection Time: 10/21/22  9:05 PM   Result Value Ref Range    Crossmatch Expiration 10/24/2022,2359     ABO/Rh(D) B Positive     Antibody screen Negative     Unit number [id number]     Blood component type RC LR     Unit division 00     Status of unit [address] to transfuse     Crossmatch result Compatible    LACTIC ACID    Collection Time: 10/21/22 10:57 PM   Result Value Ref Range    Lactic acid 1.0 0.4 - 2.0 mmol/L       Radiologic Studies -   @lastxrresult@  CT Results  (Last 48 hours)                 10/21/22 2148  CT ABD PELV W CONT Final result    Impression:  1. Dilated loops of small bowel. Long segment thickened loop of small bowel in   the pelvis. Small amount of free intraperitoneal fluid in the pelvis. Small   amount of free intraperitoneal gas in the pelvis. 5.3 cm x 5.2 cm ill-defined   gas and fluid containing collection within the deep pelvis. 2. Irregular thickened urinary bladder wall, possibly representing reactive   cystitis. Mild bilateral hydronephrosis and bilateral hydroureter. Narrative:  INDICATION: Abdominal pain, status post colectomy. CT of the abdomen and pelvis is performed with 5 mm collimation. Study is   performed with 100 cc of nonionic Isovue 370. Sagittal and coronal reformatted   images were also performed. CT dose reduction was achieved with the use of the standardized protocol   tailored for this examination and automatic exposure control for dose   modulation. There is no prior CT for comparison. Findings:       Lung bases: The visualized lung bases are clear. Liver: The liver is normal.       Adrenals: Adrenal glands are normal.       Pancreas: The pancreas is normal.       Gallbladder: The gallbladder is normal.       Kidneys: There is mild bilateral hydronephrosis and hydroureter. Spleen: The spleen is normal.       Lymph nodes.  There is no tabitha hepatitis, mesenteric, retroperitoneal or pelvic   lymphadenopathy. Bowel: There are several dilated loops of small bowel. Within the pelvis there   is a circumferentially thickened segment of small bowel measuring approximately   30 cm in length. Within the deep pelvis, there is an ill-defined gas and fluid   containing collection which measures approximately 5.2 cm x 5.3 cm. There is a 2   cm x 2 cm focus of loculated fluid within the right quadrant of the abdomen. There is a small amount of free intraperitoneal gas in the pelvis. There is a   left lower quadrant colostomy. Colon is nondilated. Appendix: The appendix is normal.       Urinary bladder: Urinary bladder wall is thickened and there is trace fluid   adjacent to the urinary bladder. CXR Results  (Last 48 hours)      None            Medical Decision Making and ED Course   Differential Diagnosis & Medical Decision Making Provider Note:   Patient presents with abdominal pain. DDx: intra-abdominal abscess or other infection, SBO, appendicitis, colitis, IBD, diverticulitis, mesenteric ischemia, AAA or descending dissection, ACS, ureteral stone. Will get labs and CT Abdomen. - I am the first provider for this patient. I reviewed the vital signs, available nursing notes, past medical history, past surgical history, family history and social history. The patients presenting problems have been discussed, and they are in agreement with the care plan formulated and outlined with them. I have encouraged them to ask questions as they arise throughout their visit. Vital Signs-Reviewed the patient's vital signs.   Patient Vitals for the past 12 hrs:   Temp Pulse Resp BP SpO2   10/22/22 0136 97.9 °F (36.6 °C) (!) 104 20 (!) 124/92 99 %   10/22/22 0100 -- (!) 108 18 (!) 124/92 98 %   10/21/22 2248 98.1 °F (36.7 °C) (!) 108 16 (!) 142/75 100 %   10/21/22 2233 98.4 °F (36.9 °C) (!) 106 18 137/77 98 %   10/21/22 2020 98 °F (36.7 °C) (!) 115 16 128/80 100 %       ED Course:   ED Course as of 10/22/22 0312   Fri Oct 21, 2022   2130 WBC 13.2, RBC 1.9, Hgb 5.8, HCT 17.7, lactic acid normal, CMP stable, no previous labs for consideration. Will type and screen and plan for transfusion. [LP]   2200 CT abdomen findings Dilated loops of small bowel. Long segment thickened loop of small bowel in the pelvis. Small amount of free intraperitoneal fluid in the pelvis. Small amount of free intraperitoneal gas in the pelvis. 5.3 cm x 5.2 cm ill-defined gas and fluid containing collection within the deep pelvis. Irregular thickened urinary bladder wall, possibly representing reactive cystitis. Mild bilateral hydronephrosis and bilateral hydroureter. [LP]   2215 Dr Fabrizio Bright paged [LP]   65 Consulted with Dr. Fabrizio Bright, he recommends transfer back to [facility] under Dr. Rolando Guerra services for continuity of care given complicated surgery with post operative infection. [LP]   8172 Family updated on plan of care.   Patient sleeping presently, 1st unit PRBC in progress [LP]   2330 Transfer initiated through [facility] transfer center [LP]   Sat Oct 22, 2022   0018 Dr Jessica Ornelas accepting physician at [facility]; transfer center to call back with bed assignment [LP]      ED Course User Index  [LP] Tino Polanco NP     CRITICAL CARE NOTE :    2:00 AM      IMPENDING DETERIORATION -Cardiovascular and Metabolic    ASSOCIATED RISK FACTORS - Bleeding, Metabolic changes, Vascular Compromise, postoperative/intra-abdominal complication    MANAGEMENT- Bedside Assessment and Transfer    INTERPRETATION -  Xrays, CT Scan, ECG, and Blood Pressure    INTERVENTIONS - hemodynamic mngmt and Metobolic interventions, blood transfusion    CASE REVIEW - Medical Sub-Specialist, Nursing, and Family    TREATMENT RESPONSE -Stable    PERFORMED BY - Self        NOTES   :      I have spent 120 minutes of critical care time involved in lab review, consultations with specialist, family decision- making, bedside attention and documentation. During this entire length of time I was immediately available to the patient . Vira Mazariegos NP    Disposition   Disposition: Transferred to [facility] patient verbally agreed to transfer and understand the risks involved as outlined in the EMTALA form. Diagnosis/Clinical Impression     Clinical Impression:   1. Postoperative surgical complication involving genitourinary system associated with genitourinary procedure, unspecified complication    2. Intraperitoneal abscess ([address]    3. Anemia, unspecified type        Attestations: Vira PALACIOS NP, am the primary clinician of record. Please note that this dictation was completed with iovation, the computer voice recognition software. Quite often unanticipated grammatical, syntax, homophones, and other interpretive errors are inadvertently transcribed by the computer software. Please disregard these errors. Please excuse any errors that have escaped final proofreading. Thank you.

## 2022-10-27 LAB
BACTERIA SPEC CULT: NORMAL
SPECIAL REQUESTS,SREQ: NORMAL